# Patient Record
Sex: FEMALE | Race: WHITE | URBAN - METROPOLITAN AREA
[De-identification: names, ages, dates, MRNs, and addresses within clinical notes are randomized per-mention and may not be internally consistent; named-entity substitution may affect disease eponyms.]

---

## 2018-10-29 RX ORDER — PANTOPRAZOLE SODIUM 40 MG/1
TABLET, DELAYED RELEASE ORAL
Qty: 90 TAB | Refills: 0 | Status: SHIPPED | OUTPATIENT
Start: 2018-10-29 | End: 2019-02-01 | Stop reason: SDUPTHER

## 2019-02-04 RX ORDER — PANTOPRAZOLE SODIUM 40 MG/1
TABLET, DELAYED RELEASE ORAL
Qty: 90 TAB | Refills: 0 | Status: SHIPPED | OUTPATIENT
Start: 2019-02-04 | End: 2021-08-26

## 2019-05-09 RX ORDER — PANTOPRAZOLE SODIUM 40 MG/1
40 TABLET, DELAYED RELEASE ORAL DAILY
COMMUNITY
End: 2019-05-09 | Stop reason: SDUPTHER

## 2019-05-10 RX ORDER — PANTOPRAZOLE SODIUM 40 MG/1
40 TABLET, DELAYED RELEASE ORAL DAILY
Qty: 30 TAB | Refills: 0 | Status: SHIPPED | OUTPATIENT
Start: 2019-05-10 | End: 2019-06-07 | Stop reason: SDUPTHER

## 2019-05-30 ENCOUNTER — OFFICE VISIT (OUTPATIENT)
Dept: FAMILY MEDICINE CLINIC | Age: 34
End: 2019-05-30

## 2019-05-30 VITALS
BODY MASS INDEX: 36.96 KG/M2 | WEIGHT: 230 LBS | RESPIRATION RATE: 20 BRPM | OXYGEN SATURATION: 99 % | HEART RATE: 98 BPM | HEIGHT: 66 IN | DIASTOLIC BLOOD PRESSURE: 83 MMHG | TEMPERATURE: 97.8 F | SYSTOLIC BLOOD PRESSURE: 128 MMHG

## 2019-05-30 DIAGNOSIS — F79 INTELLECTUAL DISABILITY: ICD-10-CM

## 2019-05-30 DIAGNOSIS — E66.01 SEVERE OBESITY (HCC): ICD-10-CM

## 2019-05-30 DIAGNOSIS — Z00.00 ANNUAL PHYSICAL EXAM: Primary | ICD-10-CM

## 2019-05-30 NOTE — PROGRESS NOTES
Assessment/Plan:     Diagnoses and all orders for this visit:    1. Annual physical exam    2. Severe obesity ([address]  -     LIPID PANEL  -     METABOLIC PANEL, COMPREHENSIVE  -     CBC WITH AUTOMATED DIFF  - Worsening, labs today. Discussed with mom about adding some walking in for the patient and routine exercise. Return in 1 year unless labs say otherwise    3. Intellectual disability   -unchanged, mom states no changes            Discussed expected course/resolution/complications of diagnosis in detail with patient.    Medication risks/benefits/costs/interactions/alternatives discussed with patient.    Pt was given after visit summary which includes diagnoses, current medications & vitals. Pt expressed understanding with the diagnosis and plan        Subjective:      Alfonzo Lal is a 29 y.o. female who presents for had concerns including Complete Physical (ANNUAL EXAM    ). Here for annual wellness. Has severe intellectual dysfunction. Mom states she baths herself and uses the toilet herself but she has to get her clothes out, turn the shower on. Mom states patient's periods are regular and she does take care of her own periods. She is not sexually active, never has been and never had a pap. Has a history of GERD and teeth were removed last May. Mom states GERD has slightly improved. Mom does not want to do a TDAP today    Current Outpatient Medications   Medication Sig Dispense Refill    docusate sodium (STOOL SOFTENER PO) Take  by mouth.  cetirizine HCl (ZYRTEC PO) Take  by mouth.  pantoprazole (PROTONIX) 40 mg tablet Take 1 Tab by mouth daily. 30 Tab 0       Allergies   Allergen Reactions    Penicillins Angioedema       ROS:   Review of Systems   Constitutional: Negative for chills, fever and malaise/fatigue. Respiratory: Negative for cough and shortness of breath. Cardiovascular: Negative for chest pain, palpitations and leg swelling.    Gastrointestinal: Negative for abdominal pain, blood in stool, constipation, diarrhea, heartburn, nausea and vomiting. Genitourinary: Negative for dysuria, frequency, hematuria and urgency. Musculoskeletal: Negative for back pain and joint pain. Neurological: Negative for dizziness, seizures and headaches. Psychiatric/Behavioral: Negative for depression. The patient does not have insomnia. Objective:     Visit Vitals  /83   Pulse 98   Temp 97.8 °F (36.6 °C) (Oral)   Resp 20   Ht 5' 6\" (1.676 m)   Wt 230 lb (104.3 kg)   SpO2 99%   BMI 37.12 kg/m²       Vitals and Nurse Documentation reviewed. Physical Exam   Constitutional: She is well-developed, well-nourished, and in no distress. HENT:   Head: Normocephalic and atraumatic. Right Ear: Tympanic membrane normal.   Left Ear: Tympanic membrane normal.   Nose: Nose normal.   Mouth/Throat: Oropharynx is clear and moist. Normal dentition. Eyes: Pupils are equal, round, and reactive to light. EOM are normal. Right eye exhibits no discharge. Left eye exhibits no discharge. Right conjunctiva is not injected. Left conjunctiva is not injected. Neck: Neck supple. Carotid bruit is not present. No thyroid mass and no thyromegaly present. Cardiovascular: Normal rate, regular rhythm, S1 normal and S2 normal. Exam reveals no gallop and no friction rub. No murmur heard. Pulses:       Dorsalis pedis pulses are 2+ on the right side. Posterior tibial pulses are 2+ on the right side. Pulmonary/Chest: Breath sounds normal.   Abdominal: Normal appearance and bowel sounds are normal. She exhibits no distension and no mass. There is no hepatosplenomegaly. There is no tenderness. Musculoskeletal: Normal range of motion. Lymphadenopathy:     She has no cervical adenopathy. Neurological: She is alert. She has normal sensation and normal strength. Gait normal. Gait normal.   Skin: Skin is warm, dry and intact. No rash noted.    Psychiatric: Mood and affect normal.       No results found for this or any previous visit.

## 2019-05-30 NOTE — PROGRESS NOTES
PATIENT'S MOTHER STATED NAME &     Chief Complaint   Patient presents with    Complete Physical     ANNUAL EXAM            Health Maintenance Due   Topic    DTaP/Tdap/Td series (1 - Tdap)    PAP AKA CERVICAL CYTOLOGY        Wt Readings from Last 3 Encounters:   19 230 lb (104.3 kg)     Temp Readings from Last 3 Encounters:   19 97.8 °F (36.6 °C) (Oral)     BP Readings from Last 3 Encounters:   19 128/83     Pulse Readings from Last 3 Encounters:   19 98         Learning Assessment:  :     No flowsheet data found. Depression Screening:  :     3 most recent PHQ Screens 2019   Little interest or pleasure in doing things Not at all   Feeling down, depressed, irritable, or hopeless Not at all   Total Score PHQ 2 0       Fall Risk Assessment:  :     No flowsheet data found. Abuse Screening:  :     No flowsheet data found. Coordination of Care Questionnaire:  :     1) Have you been to an emergency room, urgent care clinic since your last visit? NO    Hospitalized since your last visit? NO             2) Have you seen or consulted any other health care providers outside of [address] [place] [address] since your last visit? NO  (Include any pap smears or colon screenings in this section.)  LAST 2018    Patient is accompanied by mother I have received verbal consent from Marlyn Rhodes to discuss any/all medical information while they are present in the room.

## 2019-12-08 RX ORDER — PANTOPRAZOLE SODIUM 40 MG/1
TABLET, DELAYED RELEASE ORAL
Qty: 30 TAB | Refills: 0 | Status: SHIPPED | OUTPATIENT
Start: 2019-12-08 | End: 2020-03-12

## 2020-07-31 ENCOUNTER — OFFICE VISIT (OUTPATIENT)
Dept: FAMILY MEDICINE CLINIC | Age: 35
End: 2020-07-31

## 2020-07-31 VITALS
TEMPERATURE: 98.7 F | SYSTOLIC BLOOD PRESSURE: 112 MMHG | HEART RATE: 92 BPM | HEIGHT: 66 IN | DIASTOLIC BLOOD PRESSURE: 68 MMHG | OXYGEN SATURATION: 98 % | RESPIRATION RATE: 16 BRPM | WEIGHT: 225.8 LBS | BODY MASS INDEX: 36.29 KG/M2

## 2020-07-31 DIAGNOSIS — F79 MENTAL DISABILITY: ICD-10-CM

## 2020-07-31 DIAGNOSIS — E66.01 SEVERE OBESITY (HCC): ICD-10-CM

## 2020-07-31 DIAGNOSIS — R30.0 DYSURIA: ICD-10-CM

## 2020-07-31 DIAGNOSIS — Z53.20 CERVICAL CANCER SCREENING DECLINED: ICD-10-CM

## 2020-07-31 DIAGNOSIS — K21.9 GASTROESOPHAGEAL REFLUX DISEASE, ESOPHAGITIS PRESENCE NOT SPECIFIED: ICD-10-CM

## 2020-07-31 DIAGNOSIS — Z00.00 ENCOUNTER FOR ANNUAL PHYSICAL EXAM: Primary | ICD-10-CM

## 2020-07-31 RX ORDER — NITROFURANTOIN 25; 75 MG/1; MG/1
100 CAPSULE ORAL 2 TIMES DAILY
Qty: 10 CAP | Refills: 0 | Status: SHIPPED | OUTPATIENT
Start: 2020-07-31 | End: 2020-08-05

## 2020-07-31 RX ORDER — PANTOPRAZOLE SODIUM 20 MG/1
20 TABLET, DELAYED RELEASE ORAL 2 TIMES DAILY
Qty: 180 TAB | Refills: 1 | Status: SHIPPED | OUTPATIENT
Start: 2020-07-31 | End: 2021-08-26 | Stop reason: SDUPTHER

## 2020-07-31 NOTE — PROGRESS NOTES
Identified pt with two pt identifiers(name and ). Reviewed record in preparation for visit and have obtained necessary documentation. Chief Complaint   Patient presents with    Complete Physical        Health Maintenance Due   Topic    DTaP/Tdap/Td series (1 - Tdap)    PAP AKA CERVICAL CYTOLOGY        Coordination of Care Questionnaire:  :   1) Have you been to an emergency room, urgent care, or hospitalized since your last visit? If yes, where when, and reason for visit? no      2. Have seen or consulted any other health care provider since your last visit? If yes, where when, and reason for visit?   no        Patient is accompanied by self I have received verbal consent from Payal Bender to discuss any/all medical information while they are present in the room.

## 2020-07-31 NOTE — PROGRESS NOTES
[facility]        Well Woman Check Up       Subjective:     36 y.o.  F for routine annual exam    Pertinent past medical history: no history of HTN, DVT, CAD, DM, liver disease, migraines or smoking. Mother  LMP:Patient's last menstrual period was 07/09/2020 (exact date). PAP History:   Refused    Colonoscopy: No, routine screening    Mammogram:  Not yet due. DEXA: not yet due    Lipids:  Due. Past Surgical History:   Procedure Laterality Date    HX TONSIL AND ADENOIDECTOMY      AGE 12     Family History   Problem Relation Age of Onset    Coronary Artery Disease Maternal Grandmother         CAROTID ARTERY STENOSIS    Hypertension Maternal Grandmother     Diabetes Maternal Grandmother         TYPE II     Social History     Tobacco Use    Smoking status: Passive Smoke Exposure - Never Smoker    Smokeless tobacco: Never Used   Substance Use Topics    Alcohol use: Never     Frequency: Never        ROS:  Feeling well. No dyspnea or chest pain on exertion. No abdominal pain, change in bowel habits, black or bloody stools. No urinary tract symptoms. GYN ROS: no breast pain or new or enlarging lumps on self exam. No neurological complaints. Objective:     Visit Vitals  /68   Pulse 92   Temp 98.7 °F (37.1 °C) (Oral)   Resp 16   Ht 5' 6\" (1.676 m)   Wt 225 lb 12.8 oz (102.4 kg)   LMP 07/09/2020 (Exact Date)   SpO2 98%   BMI 36.45 kg/m²     Gen:  The patient appears well, alert, oriented x 3, in no distress. Obese  HEENT:  Normal, atraumatic, JOHN. Neck: supple. No adenopathy or thyromegaly. Lungs:  clear, good air entry, no wheezes, rhonchi or rales. CV: S1 and S2 normal, no murmurs, regular rate and rhythm. Abdomen: soft without tenderness, guarding, mass or organomegaly. Extremities:  no edema, normal peripheral pulses. Neurological:normal, no focal findings. BREAST EXAM: declined.     PELVIC EXAM: exam declined by the patient    Assessment/Plan:     No diagnosis found. There are no diagnoses linked to this encounter. Yosi Truong MD  07/31/20        No future appointments.

## 2021-08-26 ENCOUNTER — OFFICE VISIT (OUTPATIENT)
Dept: FAMILY MEDICINE CLINIC | Age: 36
End: 2021-08-26
Payer: MEDICAID

## 2021-08-26 VITALS
WEIGHT: 226.2 LBS | HEART RATE: 92 BPM | DIASTOLIC BLOOD PRESSURE: 83 MMHG | OXYGEN SATURATION: 98 % | BODY MASS INDEX: 36.35 KG/M2 | HEIGHT: 66 IN | TEMPERATURE: 98.4 F | SYSTOLIC BLOOD PRESSURE: 126 MMHG | RESPIRATION RATE: 20 BRPM

## 2021-08-26 DIAGNOSIS — K21.00 GASTROESOPHAGEAL REFLUX DISEASE WITH ESOPHAGITIS WITHOUT HEMORRHAGE: Primary | ICD-10-CM

## 2021-08-26 DIAGNOSIS — J30.89 ENVIRONMENTAL AND SEASONAL ALLERGIES: ICD-10-CM

## 2021-08-26 DIAGNOSIS — K59.00 CONSTIPATION, UNSPECIFIED CONSTIPATION TYPE: ICD-10-CM

## 2021-08-26 PROCEDURE — 99213 OFFICE O/P EST LOW 20 MIN: CPT | Performed by: FAMILY MEDICINE

## 2021-08-26 RX ORDER — CETIRIZINE HCL 10 MG
10 TABLET ORAL DAILY
Qty: 90 TABLET | Refills: 3 | Status: SHIPPED | OUTPATIENT
Start: 2021-08-26 | End: 2022-09-29 | Stop reason: SDUPTHER

## 2021-08-26 RX ORDER — AMOXICILLIN 250 MG
1 CAPSULE ORAL DAILY
Qty: 90 TABLET | Refills: 3 | Status: SHIPPED | OUTPATIENT
Start: 2021-08-26

## 2021-08-26 RX ORDER — PANTOPRAZOLE SODIUM 20 MG/1
20 TABLET, DELAYED RELEASE ORAL 2 TIMES DAILY
Qty: 180 TABLET | Refills: 1 | Status: SHIPPED | OUTPATIENT
Start: 2021-08-26 | End: 2022-09-29 | Stop reason: SDUPTHER

## 2021-08-26 NOTE — PROGRESS NOTES
Patient's Mother stated name &     Chief Complaint   Patient presents with    Complete Physical     No PAP        Health Maintenance Due   Topic    Hepatitis C Screening     COVID-19 Vaccine (1)    DTaP/Tdap/Td series (1 - Tdap)    PAP AKA CERVICAL CYTOLOGY        Wt Readings from Last 3 Encounters:   21 226 lb 3.2 oz (102.6 kg)   20 225 lb 12.8 oz (102.4 kg)   19 230 lb (104.3 kg)     Temp Readings from Last 3 Encounters:   21 98.4 °F (36.9 °C) (Temporal)   20 98.7 °F (37.1 °C) (Oral)   19 97.8 °F (36.6 °C) (Oral)     BP Readings from Last 3 Encounters:   21 126/83   20 112/68   19 128/83     Pulse Readings from Last 3 Encounters:   21 92   20 92   19 98         Learning Assessment:  :     Learning Assessment 2019   PRIMARY LEARNER Patient   BARRIERS PRIMARY LEARNER NONE   CO-LEARNER CAREGIVER Yes   PRIMARY LANGUAGE ENGLISH   LEARNER PREFERENCE PRIMARY VIDEOS     PICTURES   ANSWERED BY MOTHER   RELATIONSHIP OTHER       Depression Screening:  :     3 most recent PHQ Screens 2021   Little interest or pleasure in doing things Not at all   Feeling down, depressed, irritable, or hopeless Not at all   Total Score PHQ 2 0       Fall Risk Assessment:  :     Fall Risk Assessment, last 12 mths 2019   Able to walk? Yes   Fall in past 12 months? No       Abuse Screening:  :     No flowsheet data found. Coordination of Care Questionnaire:  :     1) Have you been to an emergency room, urgent care clinic since your last visit? No    Hospitalized since your last visit? No             2) Have you seen or consulted any other health care providers outside of [address] [place] [address] since your last visit? No  (Include any pap smears or colon screenings in this section.)    Patient is accompanied by mother I have received verbal consent from Lilian Cam to discuss any/all medical information while they are present in the room.

## 2021-08-26 NOTE — PROGRESS NOTES
Marya Suarez is a 30 y.o. female , established patient, here for evaluation of the following chief complaint(s):    HPI  Chief Complaint   Patient presents with    Complete Physical     No PAP     History provided by mom    1. Gastroesophageal reflux disease with esophagitis without hemorrhage  Patient reports history of acid reflux. Has belching at the end of the day that is why she is taking Protonix twice a day instead of once a day. Requests refills. 2. Constipation, unspecified constipation type  Patient reports constipation and hard stools. Take stool softer daily. Denies blood in stool. 3. Environmental and seasonal allergies  Reports allergies. Takes Zyrtec daily. Requests refills. LMP: 7/15/2021. Declines lab work today, declines immunizations today, declines Pap smear today, declined breast exam today. Review of Systems   Constitutional: Negative. HENT: Negative. Eyes: Negative. Respiratory: Negative. Cardiovascular: Negative. Gastrointestinal: Negative. Genitourinary: Negative. Musculoskeletal: Negative. Skin: Negative. Neurological: Negative. Endo/Heme/Allergies: Negative. Psychiatric/Behavioral: Negative. Physical Exam  Constitutional:       Appearance: Normal appearance. HENT:      Right Ear: Tympanic membrane, ear canal and external ear normal.      Left Ear: Tympanic membrane, ear canal and external ear normal.   Eyes:      Extraocular Movements: Extraocular movements intact. Conjunctiva/sclera: Conjunctivae normal.      Pupils: Pupils are equal, round, and reactive to light. Neck:      Thyroid: No thyromegaly. Cardiovascular:      Rate and Rhythm: Normal rate and regular rhythm. Pulmonary:      Effort: Pulmonary effort is normal.      Breath sounds: Normal breath sounds. Abdominal:      General: Bowel sounds are normal. There is no distension. Palpations: Abdomen is soft. Tenderness:  There is no abdominal tenderness. Musculoskeletal:         General: Normal range of motion. Cervical back: Normal range of motion and neck supple. Right lower leg: No edema. Left lower leg: No edema. Lymphadenopathy:      Cervical: No cervical adenopathy. Skin:     General: Skin is warm and dry. Neurological:      General: No focal deficit present. Mental Status: She is alert and oriented to person, place, and time. Mental status is at baseline. Psychiatric:         Mood and Affect: Mood normal.         Behavior: Behavior normal.       /83 (BP 1 Location: Left upper arm, BP Patient Position: Sitting, BP Cuff Size: Adult)   Pulse 92   Temp 98.4 °F (36.9 °C) (Temporal)   Resp 20   Ht 5' 6\" (1.676 m)   Wt 226 lb 3.2 oz (102.6 kg)   SpO2 98%   BMI 36.51 kg/m²     Allergies   Allergen Reactions    Amoxicillin Shortness of Breath    Penicillins Angioedema       Current Outpatient Medications   Medication Sig    OTHER daily. Flintstones vitamin without iron    pantoprazole (PROTONIX) 20 mg tablet Take 1 Tablet by mouth two (2) times a day.  senna-docusate (Stool Softener) 8.6-50 mg per tablet Take 1 Tablet by mouth daily.  cetirizine (ZyrTEC) 10 mg tablet Take 1 Tablet by mouth daily. No current facility-administered medications for this visit.        Past Medical History:   Diagnosis Date    GERD (gastroesophageal reflux disease)     H/O seasonal allergies     Mental disability     SINCE BIRTH       Past Surgical History:   Procedure Laterality Date    HX TONSIL AND ADENOIDECTOMY      AGE 12       Problem List  Date Reviewed: 8/26/2021        Codes Class Noted    Mental disability ICD-10-CM: F79  ICD-9-CM: 110  Unknown    Overview Signed 7/31/2020  3:30 PM by Saintclair Crutch, MD     SINCE BIRTH             Severe obesity ([facility] [address] ICD-10-CM: E66.01  ICD-9-CM: 278.01  5/30/2019               No results found for this visit on 08/26/21.      1. Gastroesophageal reflux disease with esophagitis without hemorrhage    - pantoprazole (PROTONIX) 20 mg tablet; Take 1 Tablet by mouth two (2) times a day. Dispense: 180 Tablet; Refill: 1    2. Constipation, unspecified constipation type    - senna-docusate (Stool Softener) 8.6-50 mg per tablet; Take 1 Tablet by mouth daily. Dispense: 90 Tablet; Refill: 3    3. Environmental and seasonal allergies    - cetirizine (ZyrTEC) 10 mg tablet; Take 1 Tablet by mouth daily. Dispense: 90 Tablet; Refill: 3        Follow-up and Dispositions    · Return in about 1 year (around 8/26/2022) for follow up. I ADVISED PATIENT TO GO TO ER IF SYMPTOMS WORSEN , CHANGE OR FAILS TO IMPROVE. I have discussed the diagnosis with the patient and the intended plan as seen in the above orders. The patient has received an after-visit summary and questions were answered concerning future plans. I have discussed medication side effects and warnings with the patient as well. The patient agrees and understands above plan.            Jordan Padilla MD

## 2022-03-18 PROBLEM — E66.01 SEVERE OBESITY (HCC): Status: ACTIVE | Noted: 2019-05-30

## 2022-09-29 ENCOUNTER — OFFICE VISIT (OUTPATIENT)
Dept: FAMILY MEDICINE CLINIC | Age: 37
End: 2022-09-29
Payer: MEDICAID

## 2022-09-29 VITALS
OXYGEN SATURATION: 97 % | RESPIRATION RATE: 16 BRPM | HEIGHT: 66 IN | HEART RATE: 85 BPM | TEMPERATURE: 97.7 F | SYSTOLIC BLOOD PRESSURE: 116 MMHG | WEIGHT: 228.2 LBS | BODY MASS INDEX: 36.67 KG/M2 | DIASTOLIC BLOOD PRESSURE: 81 MMHG

## 2022-09-29 DIAGNOSIS — Z00.00 PHYSICAL EXAM, ANNUAL: Primary | ICD-10-CM

## 2022-09-29 DIAGNOSIS — Z00.00 PREVENTATIVE HEALTH CARE: ICD-10-CM

## 2022-09-29 DIAGNOSIS — J30.89 ENVIRONMENTAL AND SEASONAL ALLERGIES: ICD-10-CM

## 2022-09-29 DIAGNOSIS — K21.00 GASTROESOPHAGEAL REFLUX DISEASE WITH ESOPHAGITIS WITHOUT HEMORRHAGE: ICD-10-CM

## 2022-09-29 PROCEDURE — 99395 PREV VISIT EST AGE 18-39: CPT | Performed by: NURSE PRACTITIONER

## 2022-09-29 RX ORDER — PANTOPRAZOLE SODIUM 20 MG/1
20 TABLET, DELAYED RELEASE ORAL 2 TIMES DAILY
Qty: 180 TABLET | Refills: 1 | Status: SHIPPED | OUTPATIENT
Start: 2022-09-29

## 2022-09-29 RX ORDER — CETIRIZINE HCL 10 MG
10 TABLET ORAL DAILY
Qty: 90 TABLET | Refills: 3 | Status: SHIPPED | OUTPATIENT
Start: 2022-09-29

## 2022-09-29 NOTE — PROGRESS NOTES
Identified pt with two pt identifiers(name and ). Reviewed record in preparation for visit and have obtained necessary documentation. Chief Complaint   Patient presents with    Physical    Medication Refill        Health Maintenance Due   Topic    Hepatitis C Screening     COVID-19 Vaccine (1)    DTaP/Tdap/Td series (1 - Tdap)    Cervical cancer screen     Flu Vaccine (1)    Depression Screen        Coordination of Care Questionnaire:  :   1) Have you been to an emergency room, urgent care, or hospitalized since your last visit? If yes, where when, and reason for visit? no       2. Have seen or consulted any other health care provider since your last visit? If yes, where when, and reason for visit? NO        Patient is accompanied by self, mother I have received verbal consent from Larry Flores to discuss any/all medical information while they are present in the room.

## 2022-09-29 NOTE — PROGRESS NOTES
Assessment/Plan:     Diagnoses and all orders for this visit:    1. Physical exam, annual  -     HEMOGLOBIN A1C WITH EAG; Future  Due for yearly physical exam, reports doing well. She is under the care of her mother 24 hours a day. Complaint of right ear pain, on inspection did see some redness in the canal appears to be from a Q-tip, this was confirmed by mother. Patient is obese we did discuss better diet and exercise. She does have a cognitive disability. Mother did report that she missed her menstrual cycle last month. This has happened before. The mother denies any chance of any type of sexual contact as she is with her all the time. I recommended if menstruation does not resume next month to see OB/GYN. 2. Preventative health care  -     LIPID PANEL; Future  -     METABOLIC PANEL, COMPREHENSIVE; Future  -     TSH 3RD GENERATION; Future  -     CBC WITH AUTOMATED DIFF; Future  Routine labs have been ordered however patient often refuses due to fear of needles. 3. Gastroesophageal reflux disease with esophagitis without hemorrhage  -     pantoprazole (PROTONIX) 20 mg tablet; Take 1 Tablet by mouth two (2) times a day. Does have gastric reflux taking medication as prescribed  4. Environmental and seasonal allergies  -     cetirizine (ZyrTEC) 10 mg tablet; Take 1 Tablet by mouth daily. Has seasonal allergy as well as pet allergy. Taking medication but does have a cat at home and does have some symptoms. Follow-up and Dispositions    Return in about 1 year (around 9/29/2023). Discussed expected course/resolution/complications of diagnosis in detail with patient. Medication risks/benefits/costs/interactions/alternatives discussed with patient. Pt was given after visit summary which includes diagnoses, current medications & vitals.    Pt expressed understanding with the diagnosis and plan        Subjective:      Miryam Spear is a 29 y.o. female who presents for had concerns including Physical and Medication Refill. Current Outpatient Medications   Medication Sig Dispense Refill    pantoprazole (PROTONIX) 20 mg tablet Take 1 Tablet by mouth two (2) times a day. 180 Tablet 1    cetirizine (ZyrTEC) 10 mg tablet Take 1 Tablet by mouth daily. 90 Tablet 3    OTHER daily. Flintstones vitamin without iron      senna-docusate (Stool Softener) 8.6-50 mg per tablet Take 1 Tablet by mouth daily. 90 Tablet 3       Allergies   Allergen Reactions    Amoxicillin Shortness of Breath    Penicillins Angioedema     Past Medical History:   Diagnosis Date    GERD (gastroesophageal reflux disease)     H/O seasonal allergies     Mental disability     SINCE BIRTH     Past Surgical History:   Procedure Laterality Date    HX TONSIL AND ADENOIDECTOMY      AGE 12     Family History   Problem Relation Age of Onset    Coronary Art Dis Maternal Grandmother         CAROTID ARTERY STENOSIS    Hypertension Maternal Grandmother     Diabetes Maternal Grandmother         TYPE II     Social History     Socioeconomic History    Marital status: SINGLE     Spouse name: Not on file    Number of children: Not on file    Years of education: Not on file    Highest education level: Not on file   Occupational History    Not on file   Tobacco Use    Smoking status: Never     Passive exposure:  Yes    Smokeless tobacco: Never   Vaping Use    Vaping Use: Never used   Substance and Sexual Activity    Alcohol use: Never    Drug use: Never    Sexual activity: Never   Other Topics Concern    Not on file   Social History Narrative    Not on file     Social Determinants of Health     Financial Resource Strain: Not on file   Food Insecurity: Not on file   Transportation Needs: Not on file   Physical Activity: Not on file   Stress: Not on file   Social Connections: Not on file   Intimate Partner Violence: Not on file   Housing Stability: Not on file       HPI      ROS:   Review of Systems   Constitutional:  Negative for fever and malaise/fatigue. HENT:  Negative for congestion, sinus pain and sore throat. Eyes: Negative. Respiratory:  Negative for cough and shortness of breath. Cardiovascular:  Negative for chest pain. Gastrointestinal:  Negative for diarrhea, nausea and vomiting. Genitourinary:  Negative for dysuria. Musculoskeletal: Negative. Skin: Negative. Neurological:  Negative for dizziness and headaches. Endo/Heme/Allergies:  Negative for environmental allergies. Psychiatric/Behavioral: Negative. Objective:   Visit Vitals  /81 (BP 1 Location: Left upper arm, BP Patient Position: Sitting, BP Cuff Size: Adult long)   Pulse 85   Temp 97.7 °F (36.5 °C) (Temporal)   Resp 16   Ht 5' 6\" (1.676 m)   Wt 228 lb 3.2 oz (103.5 kg)   SpO2 97%   BMI 36.83 kg/m²         Vitals and Nurse Documentation reviewed. Physical Exam  Vitals and nursing note reviewed. Constitutional:       General: She is not in acute distress. Appearance: Normal appearance. HENT:      Head: Normocephalic and atraumatic. Comments: Rt canal has small dried blood     Left Ear: Tympanic membrane normal.      Mouth/Throat:      Mouth: Mucous membranes are moist.   Eyes:      Pupils: Pupils are equal, round, and reactive to light. Cardiovascular:      Rate and Rhythm: Normal rate and regular rhythm. Heart sounds: Normal heart sounds. Pulmonary:      Effort: Pulmonary effort is normal.      Breath sounds: Normal breath sounds. Musculoskeletal:         General: Normal range of motion. Cervical back: Normal range of motion. Skin:     General: Skin is warm and dry. Capillary Refill: Capillary refill takes less than 2 seconds. Neurological:      General: No focal deficit present. Mental Status: She is alert. Mental status is at baseline. Psychiatric:         Mood and Affect: Mood normal.         Behavior: Behavior normal.     No results found for this or any previous visit.

## 2022-09-29 NOTE — PATIENT INSTRUCTIONS
Work on diet and exercise as able, reduce fats and sugars   Try to get labs  Follow up with GYN  if she continues with missed cycles

## 2023-05-18 RX ORDER — PANTOPRAZOLE SODIUM 20 MG/1
20 TABLET, DELAYED RELEASE ORAL 2 TIMES DAILY
COMMUNITY
Start: 2022-09-29

## 2023-05-18 RX ORDER — CETIRIZINE HYDROCHLORIDE 10 MG/1
10 TABLET ORAL DAILY
COMMUNITY
Start: 2022-09-29

## 2023-05-18 RX ORDER — AMOXICILLIN 250 MG
1 CAPSULE ORAL DAILY
COMMUNITY
Start: 2021-08-26

## 2023-09-06 ENCOUNTER — TELEPHONE (OUTPATIENT)
Facility: CLINIC | Age: 38
End: 2023-09-06

## 2023-09-06 NOTE — TELEPHONE ENCOUNTER
Called number attached to message. Spoke with patients mother. Mother stated she had been calling the [employer] location since her daughter is a pt there. Ecc sent message to wrong practice    ----- Message from [employer] sent at 9/6/2023  2:10 PM EDT -----  Subject: Appointment Request    Reason for Call: Established Patient Appointment needed: Routine Physical   Exam    QUESTIONS    Reason for appointment request? No appointments available during search     Additional Information for Provider? Patient needs to have her yearly   physical so she can get a refill of her medication. Medication will be   running out very soon.  Please call to get her scheduled asap.   ---------------------------------------------------------------------------  --------------  Tania JUAN  [phone]; OK to leave message on voicemail  ---------------------------------------------------------------------------  --------------  SCRIPT ANSWERS

## 2024-03-11 ENCOUNTER — OFFICE VISIT (OUTPATIENT)
Facility: CLINIC | Age: 39
End: 2024-03-11
Payer: MEDICAID

## 2024-03-11 VITALS
TEMPERATURE: 98.4 F | HEART RATE: 64 BPM | HEIGHT: 66 IN | DIASTOLIC BLOOD PRESSURE: 79 MMHG | BODY MASS INDEX: 37.28 KG/M2 | OXYGEN SATURATION: 100 % | RESPIRATION RATE: 16 BRPM | SYSTOLIC BLOOD PRESSURE: 127 MMHG | WEIGHT: 232 LBS

## 2024-03-11 DIAGNOSIS — K21.00 GASTRO-ESOPHAGEAL REFLUX DISEASE WITH ESOPHAGITIS, WITHOUT BLEEDING: ICD-10-CM

## 2024-03-11 DIAGNOSIS — Z00.00 ANNUAL PHYSICAL EXAM: Primary | ICD-10-CM

## 2024-03-11 PROBLEM — J30.89 ENVIRONMENTAL AND SEASONAL ALLERGIES: Status: ACTIVE | Noted: 2024-03-11

## 2024-03-11 PROBLEM — K59.09 CHRONIC CONSTIPATION: Status: ACTIVE | Noted: 2024-03-11

## 2024-03-11 PROCEDURE — 99395 PREV VISIT EST AGE 18-39: CPT | Performed by: STUDENT IN AN ORGANIZED HEALTH CARE EDUCATION/TRAINING PROGRAM

## 2024-03-11 RX ORDER — FAMOTIDINE 20 MG/1
20 TABLET, FILM COATED ORAL 2 TIMES DAILY
Qty: 180 TABLET | Refills: 1 | Status: SHIPPED | OUTPATIENT
Start: 2024-03-11

## 2024-03-11 ASSESSMENT — PATIENT HEALTH QUESTIONNAIRE - PHQ9
1. LITTLE INTEREST OR PLEASURE IN DOING THINGS: 0
SUM OF ALL RESPONSES TO PHQ9 QUESTIONS 1 & 2: 0
SUM OF ALL RESPONSES TO PHQ QUESTIONS 1-9: 0
2. FEELING DOWN, DEPRESSED OR HOPELESS: 0
SUM OF ALL RESPONSES TO PHQ QUESTIONS 1-9: 0

## 2024-03-11 ASSESSMENT — ENCOUNTER SYMPTOMS
COUGH: 0
ABDOMINAL PAIN: 0
VOMITING: 0
RHINORRHEA: 0
SHORTNESS OF BREATH: 0
NAUSEA: 0

## 2024-03-11 NOTE — PROGRESS NOTES
Identified pt with two pt identifiers(name and ). Reviewed record in preparation for visit and have obtained necessary documentation.  Chief Complaint   Patient presents with    Annual Exam        Health Maintenance Due   Topic    Hepatitis B vaccine (1 of 3 - 3-dose series)    COVID-19 Vaccine (1)    Varicella vaccine (1 of 2 - 2-dose childhood series)    HIV screen     Hepatitis C screen     DTaP/Tdap/Td vaccine (1 - Tdap)    Cervical cancer screen     Diabetes screen     Flu vaccine (1)    Depression Screen        Coordination of Care Questionnaire:  :   1) Have you been to an emergency room, urgent care, or hospitalized since your last visit?  If yes, where when, and reason for visit? no    2. Have seen or consulted any other health care provider since your last visit?   If yes, where when, and reason for visit?  no        Patient is accompanied by self , mother I have received verbal consent from Robert Barrientos to discuss any/all medical information while they are present in the room.

## 2024-03-11 NOTE — PROGRESS NOTES
Robert Barrientos  38 y.o. female  1985  MRN:[id number]  [facility]  Progress Note     Assessment and Plan:    1. Annual physical exam  - CBC; Future  - Comprehensive Metabolic Panel; Future  - Hemoglobin A1C; Future  - Lipid Panel; Future  - TSH; Future  -Advise scheduling routine eye exam  -Mother declines Pap smear for patient at this time given it would be traumatizing for her  -Colon cancer screening starting at age 45  -Will try to see if we can get lab work checked based on patient's cooperation  -Mother states patient is up-to-date on all immunization however declines the flu vaccine    2. Gastro-esophageal reflux disease with esophagitis, without bleeding  - famotidine (PEPCID) 20 MG tablet; Take 1 tablet by mouth 2 times daily  Dispense: 180 tablet; Refill: 1  -Chronic.  Stable.  -Has been on pantoprazole for several years  -Therefore recommend trialing off of pantoprazole and switching to famotidine  -If symptoms recur may need to restart pantoprazole however would then recommend evaluation by gastroenterology       Diet, exercise and safety discussed with patient.  Diagnoses and plans were discussed with the patient.   After visit summary given to the patient as well.    Return in about 1 year (around 3/11/2025), or if symptoms worsen or fail to improve.     Zenobia Agustin MD    Robert Barrientos is a 38 y.o. female who had concerns including Annual Exam.  Patient has a history of intellectual disability.  She is currently under the care of her mother.  She does have a known history of constipation that is stable on over-the-counter medication.  She also has a known history of acid reflux and had previously been on pantoprazole.  She has been on pantoprazole for several years and has never tried off the medication.  Mother is agreeable to trying her on famotidine to see if this provides adequate relief of her symptoms.  She does have regular menstrual cycles and